# Patient Record
Sex: MALE | Race: WHITE
[De-identification: names, ages, dates, MRNs, and addresses within clinical notes are randomized per-mention and may not be internally consistent; named-entity substitution may affect disease eponyms.]

---

## 2021-01-13 DIAGNOSIS — Z23 NEED FOR VACCINATION: ICD-10-CM

## 2021-04-08 ENCOUNTER — HOSPITAL ENCOUNTER (OUTPATIENT)
Dept: HOSPITAL 8 - CFH | Age: 81
Discharge: HOME | End: 2021-04-08
Attending: INTERNAL MEDICINE
Payer: COMMERCIAL

## 2021-04-08 DIAGNOSIS — I25.9: ICD-10-CM

## 2021-04-08 DIAGNOSIS — I21.29: Primary | ICD-10-CM

## 2021-04-08 DIAGNOSIS — I10: ICD-10-CM

## 2021-04-08 DIAGNOSIS — I25.10: ICD-10-CM

## 2021-04-08 PROCEDURE — 93017 CV STRESS TEST TRACING ONLY: CPT

## 2021-04-08 PROCEDURE — A9502 TC99M TETROFOSMIN: HCPCS

## 2021-04-08 PROCEDURE — 78452 HT MUSCLE IMAGE SPECT MULT: CPT

## 2021-05-07 ENCOUNTER — HOSPITAL ENCOUNTER (OUTPATIENT)
Dept: HOSPITAL 8 - STAR | Age: 81
Discharge: HOME | End: 2021-05-07
Attending: INTERNAL MEDICINE
Payer: COMMERCIAL

## 2021-05-07 DIAGNOSIS — K80.50: ICD-10-CM

## 2021-05-07 DIAGNOSIS — R00.1: ICD-10-CM

## 2021-05-07 DIAGNOSIS — Z20.822: ICD-10-CM

## 2021-05-07 DIAGNOSIS — Z01.818: Primary | ICD-10-CM

## 2021-05-07 LAB
ALBUMIN SERPL-MCNC: 3.5 G/DL (ref 3.4–5)
ALP SERPL-CCNC: 256 U/L (ref 45–117)
ALT SERPL-CCNC: 82 U/L (ref 12–78)
ANION GAP SERPL CALC-SCNC: 3 MMOL/L (ref 5–15)
BILIRUB SERPL-MCNC: 0.6 MG/DL (ref 0.2–1)
CALCIUM SERPL-MCNC: 8.6 MG/DL (ref 8.5–10.1)
CHLORIDE SERPL-SCNC: 107 MMOL/L (ref 98–107)
CREAT SERPL-MCNC: 1.4 MG/DL (ref 0.7–1.3)
PROT SERPL-MCNC: 7.5 G/DL (ref 6.4–8.2)

## 2021-05-07 PROCEDURE — 93005 ELECTROCARDIOGRAM TRACING: CPT

## 2021-05-07 PROCEDURE — 80053 COMPREHEN METABOLIC PANEL: CPT

## 2021-05-07 PROCEDURE — U0003 INFECTIOUS AGENT DETECTION BY NUCLEIC ACID (DNA OR RNA); SEVERE ACUTE RESPIRATORY SYNDROME CORONAVIRUS 2 (SARS-COV-2) (CORONAVIRUS DISEASE [COVID-19]), AMPLIFIED PROBE TECHNIQUE, MAKING USE OF HIGH THROUGHPUT TECHNOLOGIES AS DESCRIBED BY CMS-2020-01-R: HCPCS

## 2021-05-07 PROCEDURE — 36415 COLL VENOUS BLD VENIPUNCTURE: CPT

## 2021-05-12 ENCOUNTER — HOSPITAL ENCOUNTER (OUTPATIENT)
Dept: HOSPITAL 8 - OUT | Age: 81
Discharge: HOME | End: 2021-05-12
Attending: INTERNAL MEDICINE
Payer: COMMERCIAL

## 2021-05-12 VITALS — DIASTOLIC BLOOD PRESSURE: 83 MMHG | SYSTOLIC BLOOD PRESSURE: 181 MMHG

## 2021-05-12 VITALS — WEIGHT: 164.91 LBS | BODY MASS INDEX: 26.5 KG/M2 | HEIGHT: 66 IN

## 2021-05-12 DIAGNOSIS — K29.50: ICD-10-CM

## 2021-05-12 DIAGNOSIS — K80.51: Primary | ICD-10-CM

## 2021-05-12 DIAGNOSIS — Z98.890: ICD-10-CM

## 2021-05-12 DIAGNOSIS — E03.9: ICD-10-CM

## 2021-05-12 DIAGNOSIS — E11.9: ICD-10-CM

## 2021-05-12 DIAGNOSIS — Z95.1: ICD-10-CM

## 2021-05-12 DIAGNOSIS — Z79.890: ICD-10-CM

## 2021-05-12 DIAGNOSIS — K22.5: ICD-10-CM

## 2021-05-12 DIAGNOSIS — I10: ICD-10-CM

## 2021-05-12 DIAGNOSIS — G43.909: ICD-10-CM

## 2021-05-12 DIAGNOSIS — K57.10: ICD-10-CM

## 2021-05-12 DIAGNOSIS — E78.00: ICD-10-CM

## 2021-05-12 DIAGNOSIS — Z79.84: ICD-10-CM

## 2021-05-12 DIAGNOSIS — K21.9: ICD-10-CM

## 2021-05-12 DIAGNOSIS — I25.2: ICD-10-CM

## 2021-05-12 DIAGNOSIS — Z79.899: ICD-10-CM

## 2021-05-12 DIAGNOSIS — Z79.82: ICD-10-CM

## 2021-05-12 PROCEDURE — 74328 X-RAY BILE DUCT ENDOSCOPY: CPT

## 2021-05-12 PROCEDURE — C1769 GUIDE WIRE: HCPCS

## 2021-05-12 PROCEDURE — 43239 EGD BIOPSY SINGLE/MULTIPLE: CPT

## 2021-05-12 PROCEDURE — 43264 ERCP REMOVE DUCT CALCULI: CPT

## 2021-05-12 PROCEDURE — 43259 EGD US EXAM DUODENUM/JEJUNUM: CPT

## 2021-05-12 PROCEDURE — 82962 GLUCOSE BLOOD TEST: CPT

## 2021-05-12 PROCEDURE — 88305 TISSUE EXAM BY PATHOLOGIST: CPT

## 2021-05-12 PROCEDURE — 43262 ENDO CHOLANGIOPANCREATOGRAPH: CPT

## 2024-06-19 ENCOUNTER — APPOINTMENT (OUTPATIENT)
Dept: RADIOLOGY | Facility: MEDICAL CENTER | Age: 84
DRG: 146 | End: 2024-06-19
Attending: EMERGENCY MEDICINE
Payer: COMMERCIAL

## 2024-06-19 ENCOUNTER — HOSPITAL ENCOUNTER (INPATIENT)
Facility: MEDICAL CENTER | Age: 84
LOS: 1 days | DRG: 146 | End: 2024-06-20
Attending: EMERGENCY MEDICINE | Admitting: HOSPITALIST
Payer: COMMERCIAL

## 2024-06-19 DIAGNOSIS — R13.10 DYSPHAGIA, UNSPECIFIED TYPE: ICD-10-CM

## 2024-06-19 DIAGNOSIS — C09.9 TONSILLAR CANCER (HCC): ICD-10-CM

## 2024-06-19 DIAGNOSIS — E87.1 HYPONATREMIA: ICD-10-CM

## 2024-06-19 DIAGNOSIS — E86.0 DEHYDRATION: ICD-10-CM

## 2024-06-19 PROBLEM — E11.9 TYPE 2 DIABETES MELLITUS (HCC): Status: ACTIVE | Noted: 2024-06-19

## 2024-06-19 PROBLEM — I48.91 ATRIAL FIBRILLATION (HCC): Status: ACTIVE | Noted: 2024-06-19

## 2024-06-19 PROBLEM — R62.7 FAILURE TO THRIVE IN ADULT: Status: ACTIVE | Noted: 2024-06-19

## 2024-06-19 PROBLEM — I25.10 CORONARY DISEASE: Status: ACTIVE | Noted: 2024-06-19

## 2024-06-19 LAB
ALBUMIN SERPL BCP-MCNC: 3 G/DL (ref 3.2–4.9)
ALBUMIN/GLOB SERPL: 0.9 G/DL
ALP SERPL-CCNC: 128 U/L (ref 30–99)
ALT SERPL-CCNC: 42 U/L (ref 2–50)
ANION GAP SERPL CALC-SCNC: 13 MMOL/L (ref 7–16)
AST SERPL-CCNC: 22 U/L (ref 12–45)
BASOPHILS # BLD AUTO: 0.3 % (ref 0–1.8)
BASOPHILS # BLD: 0.03 K/UL (ref 0–0.12)
BILIRUB SERPL-MCNC: 0.2 MG/DL (ref 0.1–1.5)
BUN SERPL-MCNC: 30 MG/DL (ref 8–22)
CALCIUM ALBUM COR SERPL-MCNC: 9.9 MG/DL (ref 8.5–10.5)
CALCIUM SERPL-MCNC: 9.1 MG/DL (ref 8.5–10.5)
CHLORIDE SERPL-SCNC: 95 MMOL/L (ref 96–112)
CO2 SERPL-SCNC: 21 MMOL/L (ref 20–33)
CREAT SERPL-MCNC: 0.83 MG/DL (ref 0.5–1.4)
EOSINOPHIL # BLD AUTO: 0.01 K/UL (ref 0–0.51)
EOSINOPHIL NFR BLD: 0.1 % (ref 0–6.9)
ERYTHROCYTE [DISTWIDTH] IN BLOOD BY AUTOMATED COUNT: 52.7 FL (ref 35.9–50)
GFR SERPLBLD CREATININE-BSD FMLA CKD-EPI: 86 ML/MIN/1.73 M 2
GLOBULIN SER CALC-MCNC: 3.5 G/DL (ref 1.9–3.5)
GLUCOSE BLD STRIP.AUTO-MCNC: 260 MG/DL (ref 65–99)
GLUCOSE SERPL-MCNC: 367 MG/DL (ref 65–99)
HCT VFR BLD AUTO: 34.8 % (ref 42–52)
HGB BLD-MCNC: 11.3 G/DL (ref 14–18)
IMM GRANULOCYTES # BLD AUTO: 0.17 K/UL (ref 0–0.11)
IMM GRANULOCYTES NFR BLD AUTO: 1.8 % (ref 0–0.9)
LYMPHOCYTES # BLD AUTO: 0.39 K/UL (ref 1–4.8)
LYMPHOCYTES NFR BLD: 4.1 % (ref 22–41)
MCH RBC QN AUTO: 28.7 PG (ref 27–33)
MCHC RBC AUTO-ENTMCNC: 32.5 G/DL (ref 32.3–36.5)
MCV RBC AUTO: 88.3 FL (ref 81.4–97.8)
MONOCYTES # BLD AUTO: 0.42 K/UL (ref 0–0.85)
MONOCYTES NFR BLD AUTO: 4.4 % (ref 0–13.4)
NEUTROPHILS # BLD AUTO: 8.55 K/UL (ref 1.82–7.42)
NEUTROPHILS NFR BLD: 89.3 % (ref 44–72)
NRBC # BLD AUTO: 0 K/UL
NRBC BLD-RTO: 0 /100 WBC (ref 0–0.2)
PLATELET # BLD AUTO: 456 K/UL (ref 164–446)
PMV BLD AUTO: 10.3 FL (ref 9–12.9)
POTASSIUM SERPL-SCNC: 5 MMOL/L (ref 3.6–5.5)
PROT SERPL-MCNC: 6.5 G/DL (ref 6–8.2)
RBC # BLD AUTO: 3.94 M/UL (ref 4.7–6.1)
SODIUM SERPL-SCNC: 129 MMOL/L (ref 135–145)
SODIUM UR-SCNC: 96 MMOL/L
WBC # BLD AUTO: 9.6 K/UL (ref 4.8–10.8)

## 2024-06-19 PROCEDURE — 700105 HCHG RX REV CODE 258: Performed by: EMERGENCY MEDICINE

## 2024-06-19 PROCEDURE — A9270 NON-COVERED ITEM OR SERVICE: HCPCS | Performed by: HOSPITALIST

## 2024-06-19 PROCEDURE — 99223 1ST HOSP IP/OBS HIGH 75: CPT | Mod: 25,AI | Performed by: HOSPITALIST

## 2024-06-19 PROCEDURE — 84300 ASSAY OF URINE SODIUM: CPT

## 2024-06-19 PROCEDURE — 99497 ADVNCD CARE PLAN 30 MIN: CPT | Performed by: HOSPITALIST

## 2024-06-19 PROCEDURE — 99285 EMERGENCY DEPT VISIT HI MDM: CPT

## 2024-06-19 PROCEDURE — 700102 HCHG RX REV CODE 250 W/ 637 OVERRIDE(OP): Performed by: HOSPITALIST

## 2024-06-19 PROCEDURE — 71045 X-RAY EXAM CHEST 1 VIEW: CPT

## 2024-06-19 PROCEDURE — 770004 HCHG ROOM/CARE - ONCOLOGY PRIVATE *

## 2024-06-19 PROCEDURE — 80053 COMPREHEN METABOLIC PANEL: CPT

## 2024-06-19 PROCEDURE — 82962 GLUCOSE BLOOD TEST: CPT

## 2024-06-19 PROCEDURE — 36415 COLL VENOUS BLD VENIPUNCTURE: CPT

## 2024-06-19 PROCEDURE — 85025 COMPLETE CBC W/AUTO DIFF WBC: CPT

## 2024-06-19 PROCEDURE — 83935 ASSAY OF URINE OSMOLALITY: CPT

## 2024-06-19 RX ORDER — SODIUM CHLORIDE 9 MG/ML
INJECTION, SOLUTION INTRAVENOUS CONTINUOUS
Status: DISCONTINUED | OUTPATIENT
Start: 2024-06-19 | End: 2024-06-20 | Stop reason: HOSPADM

## 2024-06-19 RX ORDER — ACETAMINOPHEN 325 MG/1
650 TABLET ORAL EVERY 6 HOURS PRN
Status: DISCONTINUED | OUTPATIENT
Start: 2024-06-19 | End: 2024-06-20 | Stop reason: HOSPADM

## 2024-06-19 RX ORDER — METOPROLOL SUCCINATE 25 MG/1
25 TABLET, EXTENDED RELEASE ORAL DAILY
COMMUNITY
Start: 2023-11-20 | End: 2024-11-19

## 2024-06-19 RX ORDER — LEVOTHYROXINE SODIUM 0.07 MG/1
75 TABLET ORAL
COMMUNITY

## 2024-06-19 RX ORDER — LABETALOL HYDROCHLORIDE 5 MG/ML
10 INJECTION, SOLUTION INTRAVENOUS EVERY 4 HOURS PRN
Status: DISCONTINUED | OUTPATIENT
Start: 2024-06-19 | End: 2024-06-20 | Stop reason: HOSPADM

## 2024-06-19 RX ORDER — METOPROLOL SUCCINATE 25 MG/1
25 TABLET, EXTENDED RELEASE ORAL DAILY
Status: DISCONTINUED | OUTPATIENT
Start: 2024-06-20 | End: 2024-06-19

## 2024-06-19 RX ORDER — ATORVASTATIN CALCIUM 40 MG/1
40 TABLET, FILM COATED ORAL DAILY
Status: DISCONTINUED | OUTPATIENT
Start: 2024-06-20 | End: 2024-06-20 | Stop reason: HOSPADM

## 2024-06-19 RX ORDER — LEVOTHYROXINE SODIUM 0.07 MG/1
75 TABLET ORAL
Status: DISCONTINUED | OUTPATIENT
Start: 2024-06-20 | End: 2024-06-20 | Stop reason: HOSPADM

## 2024-06-19 RX ORDER — GLYCOPYRROLATE 0.2 MG/ML
0.1 INJECTION INTRAMUSCULAR; INTRAVENOUS 4 TIMES DAILY
Status: DISCONTINUED | OUTPATIENT
Start: 2024-06-19 | End: 2024-06-20 | Stop reason: HOSPADM

## 2024-06-19 RX ORDER — LEVOTHYROXINE SODIUM 0.07 MG/1
75 TABLET ORAL
Status: DISCONTINUED | OUTPATIENT
Start: 2024-06-20 | End: 2024-06-19

## 2024-06-19 RX ORDER — ACETAMINOPHEN 325 MG/1
650 TABLET ORAL EVERY 6 HOURS PRN
Status: DISCONTINUED | OUTPATIENT
Start: 2024-06-19 | End: 2024-06-19

## 2024-06-19 RX ORDER — ATORVASTATIN CALCIUM 40 MG/1
40 TABLET, FILM COATED ORAL DAILY
COMMUNITY

## 2024-06-19 RX ORDER — ATORVASTATIN CALCIUM 40 MG/1
40 TABLET, FILM COATED ORAL DAILY
Status: DISCONTINUED | OUTPATIENT
Start: 2024-06-20 | End: 2024-06-19

## 2024-06-19 RX ORDER — SULFAMETHOXAZOLE AND TRIMETHOPRIM 400; 80 MG/1; MG/1
1 TABLET ORAL EVERY 8 HOURS
COMMUNITY
Start: 2024-06-11

## 2024-06-19 RX ORDER — CLINDAMYCIN HYDROCHLORIDE 300 MG/1
300 CAPSULE ORAL 3 TIMES DAILY
COMMUNITY
Start: 2024-05-02

## 2024-06-19 RX ORDER — LINAGLIPTIN 5 MG/1
5 TABLET, FILM COATED ORAL DAILY
COMMUNITY

## 2024-06-19 RX ADMIN — INSULIN HUMAN 5 UNITS: 100 INJECTION, SOLUTION PARENTERAL at 21:59

## 2024-06-19 RX ADMIN — METOPROLOL TARTRATE 12.5 MG: 25 TABLET, FILM COATED ORAL at 21:52

## 2024-06-19 RX ADMIN — SODIUM CHLORIDE: 9 INJECTION, SOLUTION INTRAVENOUS at 18:39

## 2024-06-19 SDOH — ECONOMIC STABILITY: TRANSPORTATION INSECURITY
IN THE PAST 12 MONTHS, HAS LACK OF RELIABLE TRANSPORTATION KEPT YOU FROM MEDICAL APPOINTMENTS, MEETINGS, WORK OR FROM GETTING THINGS NEEDED FOR DAILY LIVING?: NO

## 2024-06-19 SDOH — ECONOMIC STABILITY: TRANSPORTATION INSECURITY
IN THE PAST 12 MONTHS, HAS THE LACK OF TRANSPORTATION KEPT YOU FROM MEDICAL APPOINTMENTS OR FROM GETTING MEDICATIONS?: NO

## 2024-06-19 ASSESSMENT — ENCOUNTER SYMPTOMS
DIAPHORESIS: 0
SINUS PAIN: 0
CONSTIPATION: 0
SORE THROAT: 0
MYALGIAS: 0
ABDOMINAL PAIN: 0
PALPITATIONS: 0
WEAKNESS: 0
DIARRHEA: 0
FLANK PAIN: 0
HEADACHES: 0
BRUISES/BLEEDS EASILY: 0
CLAUDICATION: 0
FALLS: 0
BLOOD IN STOOL: 0
DOUBLE VISION: 0
TREMORS: 0
VOMITING: 0
HEMOPTYSIS: 0
POLYDIPSIA: 0
WHEEZING: 0
NECK PAIN: 0
NAUSEA: 0
PND: 0
BLURRED VISION: 0
PHOTOPHOBIA: 0
COUGH: 0
CHILLS: 0
DEPRESSION: 0
HALLUCINATIONS: 0
EYE PAIN: 0
STRIDOR: 0
BACK PAIN: 0
HEARTBURN: 0
SHORTNESS OF BREATH: 0
DIZZINESS: 0
FEVER: 0
TINGLING: 0
ORTHOPNEA: 0
SPUTUM PRODUCTION: 0

## 2024-06-19 ASSESSMENT — CHA2DS2 SCORE
AGE 65 TO 74: NO
HYPERTENSION: NO
CHA2DS2 VASC SCORE: 2
VASCULAR DISEASE: NO
CHF OR LEFT VENTRICULAR DYSFUNCTION: NO
PRIOR STROKE OR TIA OR THROMBOEMBOLISM: NO
SEX: MALE
AGE 75 OR GREATER: YES
DIABETES: NO

## 2024-06-19 ASSESSMENT — COGNITIVE AND FUNCTIONAL STATUS - GENERAL
DAILY ACTIVITIY SCORE: 18
TOILETING: A LOT
EATING MEALS: A LITTLE
PERSONAL GROOMING: A LITTLE
DRESSING REGULAR LOWER BODY CLOTHING: A LITTLE
SUGGESTED CMS G CODE MODIFIER DAILY ACTIVITY: CK
SUGGESTED CMS G CODE MODIFIER MOBILITY: CJ
DRESSING REGULAR UPPER BODY CLOTHING: A LITTLE
MOBILITY SCORE: 22
MOVING FROM LYING ON BACK TO SITTING ON SIDE OF FLAT BED: A LITTLE
MOVING TO AND FROM BED TO CHAIR: A LITTLE

## 2024-06-19 ASSESSMENT — SOCIAL DETERMINANTS OF HEALTH (SDOH)
WITHIN THE LAST YEAR, HAVE YOU BEEN HUMILIATED OR EMOTIONALLY ABUSED IN OTHER WAYS BY YOUR PARTNER OR EX-PARTNER?: NO
WITHIN THE LAST YEAR, HAVE TO BEEN RAPED OR FORCED TO HAVE ANY KIND OF SEXUAL ACTIVITY BY YOUR PARTNER OR EX-PARTNER?: NO
WITHIN THE LAST YEAR, HAVE YOU BEEN AFRAID OF YOUR PARTNER OR EX-PARTNER?: NO
WITHIN THE PAST 12 MONTHS, YOU WORRIED THAT YOUR FOOD WOULD RUN OUT BEFORE YOU GOT THE MONEY TO BUY MORE: NEVER TRUE
IN THE PAST 12 MONTHS, HAS THE ELECTRIC, GAS, OIL, OR WATER COMPANY THREATENED TO SHUT OFF SERVICE IN YOUR HOME?: NO
WITHIN THE PAST 12 MONTHS, THE FOOD YOU BOUGHT JUST DIDN'T LAST AND YOU DIDN'T HAVE MONEY TO GET MORE: NEVER TRUE
WITHIN THE LAST YEAR, HAVE YOU BEEN KICKED, HIT, SLAPPED, OR OTHERWISE PHYSICALLY HURT BY YOUR PARTNER OR EX-PARTNER?: NO

## 2024-06-19 ASSESSMENT — PATIENT HEALTH QUESTIONNAIRE - PHQ9
SUM OF ALL RESPONSES TO PHQ9 QUESTIONS 1 AND 2: 0
2. FEELING DOWN, DEPRESSED, IRRITABLE, OR HOPELESS: NOT AT ALL
1. LITTLE INTEREST OR PLEASURE IN DOING THINGS: NOT AT ALL

## 2024-06-19 ASSESSMENT — LIFESTYLE VARIABLES
HAVE PEOPLE ANNOYED YOU BY CRITICIZING YOUR DRINKING: NO
SUBSTANCE_ABUSE: 0
HAVE YOU EVER FELT YOU SHOULD CUT DOWN ON YOUR DRINKING: NO
EVER HAD A DRINK FIRST THING IN THE MORNING TO STEADY YOUR NERVES TO GET RID OF A HANGOVER: NO
CONSUMPTION TOTAL: NEGATIVE
EVER FELT BAD OR GUILTY ABOUT YOUR DRINKING: NO
DOES PATIENT WANT TO STOP DRINKING: NO
HOW MANY TIMES IN THE PAST YEAR HAVE YOU HAD 5 OR MORE DRINKS IN A DAY: 0
ALCOHOL_USE: NO
ON A TYPICAL DAY WHEN YOU DRINK ALCOHOL HOW MANY DRINKS DO YOU HAVE: 0
TOTAL SCORE: 0
AVERAGE NUMBER OF DAYS PER WEEK YOU HAVE A DRINK CONTAINING ALCOHOL: 0
TOTAL SCORE: 0
TOTAL SCORE: 0

## 2024-06-19 ASSESSMENT — FIBROSIS 4 INDEX
FIB4 SCORE: 0.85
FIB4 SCORE: 0.62
FIB4 SCORE: 0.62

## 2024-06-19 ASSESSMENT — PAIN DESCRIPTION - PAIN TYPE: TYPE: ACUTE PAIN

## 2024-06-19 NOTE — ED PROVIDER NOTES
"ER Provider Note    Scribed for Rosmery Nielson M.d. by Marcia High. 6/19/2024  4:44 PM    Primary Care Provider: Pcp Pt States None    CHIEF COMPLAINT   Chief Complaint   Patient presents with    Sent by MD     Tonsillar squamous cell carcinoma  Post radiation  Deep laryngeal secretions  Airway compromise, desats to low 80's       EXTERNAL RECORDS REVIEWED  Outpatient Notes Patient presents from Cancer care specialist with history of tonsillar squamous cell carcinoma, post radiation with intermittent airway compromise and desatting to the mid 80s. She gets her radiation at [facility]. She is on Eliquis. He also undergoes chemotherapy. He has a PEG tube.      HPI/ROS  LIMITATION TO HISTORY   Select: : None  OUTSIDE HISTORIAN(S):  Family at bedside    Dre Cleary is a 83 y.o. male, with a history of Tonsillar squamous cell carcinoma, who presents to the ED from Cancer care specialist, complaining of progressively worsening thick phlegm production in his mouth which is causing him to occasionally choke.  Oncologist today noted some transient oxygen desaturations due to the thick secretions.. He reports associated shortness of breath and intermittent episodes of choking due to the phlegm. He will occasionally spit it out, and it is described as \"clear saliva\". The patient has undergone 33 radiation treatments and 6 chemotherapy infusions, most recently on May 22, 2024. He was cleared to take food and fluids by mouth but he chooses not to due to the congestion in his throat. Family at bedside states he was able to tolerate one cup of coffee last week, but chooses to utilize the PEG tube instead of swallowing food or fluid. He was seen by his cardiologist in (Dr. Robins) at the end of May and was cleared from a cardiology stand point. He is still anticoagulated on Eliquis. He denies any cough, pain with swallowing, or difficulty swallowing.  He complains of some mild shortness of breath at this time but he is not " "in any respiratory distress.  Wife says that for about a week he was not taking any of his medications because he did not want to swallow them.  As soon as she found out he was not taking his medicine she started administering them to the PEG tube.  He has been getting his meds through PEG tube for the last week or so.  He does not check his blood sugars.    PAST MEDICAL HISTORY  Past Medical History:   Diagnosis Date    Cancer (HCC)     Diabetes (HCC)      SURGICAL HISTORY  History reviewed. No pertinent surgical history.    FAMILY HISTORY  History reviewed. No pertinent family history.    SOCIAL HISTORY   reports that he has never smoked. He has never used smokeless tobacco. He reports that he does not drink alcohol and does not use drugs.    CURRENT MEDICATIONS  No current outpatient medications    ALLERGIES  Patient has no known allergies.    PHYSICAL EXAM  /60   Pulse 81   Temp 36.6 °C (97.9 °F) (Temporal)   Resp 16   Ht 1.626 m (5' 4\")   Wt 55.4 kg (122 lb 2.2 oz)   SpO2 97%   BMI 20.96 kg/m²   Constitutional: chronically ill appearing, does not speak often but when he does his voice is normal  HENT: thick clear secretions pooling in his mouth. No trismus. Normocephalic, Atraumatic, Bilateral external ears normal, Oropharynx moist, attempted to look in the posterior oropharynx, but the patient has a severe gag reflex and unable to get a good visualization.  Eyes: PERRL, EOMI, Conjunctiva normal, No discharge.   Neck: Normal range of motion, supple, nontender. No stridor.  Lymphatic: No lymphadenopathy noted.   Cardiovascular: Normal heart rate, Normal rhythm, No murmurs, rubs or gallops   Thorax & Lungs: There are a few scattered rhonchi in the left base. No respiratory distress, No wheezing or rales No chest tenderness. No crepitus or subQ air  Abdomen: soft, good bowel sounds, no guarding no rebound, no masses, no pulsatile mass, no tenderness, no distention  Skin: Warm, Dry, No erythema, No " rash.   Back: No tenderness, No CVA tenderness.   Extremities: 2+ dp and pt pulses bilateral LEs;  Nontender; no pretibial edema  Neurologic: Alert & oriented x 4,does not speak often but when he does his voice is normal and speech is clear  Psychiatric: appropriate, normal affect    DIAGNOSTIC STUDIES    EKG/LABS  Results for orders placed or performed during the hospital encounter of 06/19/24   CBC WITH DIFFERENTIAL   Result Value Ref Range    WBC 9.6 4.8 - 10.8 K/uL    RBC 3.94 (L) 4.70 - 6.10 M/uL    Hemoglobin 11.3 (L) 14.0 - 18.0 g/dL    Hematocrit 34.8 (L) 42.0 - 52.0 %    MCV 88.3 81.4 - 97.8 fL    MCH 28.7 27.0 - 33.0 pg    MCHC 32.5 32.3 - 36.5 g/dL    RDW 52.7 (H) 35.9 - 50.0 fL    Platelet Count 456 (H) 164 - 446 K/uL    MPV 10.3 9.0 - 12.9 fL    Neutrophils-Polys 89.30 (H) 44.00 - 72.00 %    Lymphocytes 4.10 (L) 22.00 - 41.00 %    Monocytes 4.40 0.00 - 13.40 %    Eosinophils 0.10 0.00 - 6.90 %    Basophils 0.30 0.00 - 1.80 %    Immature Granulocytes 1.80 (H) 0.00 - 0.90 %    Nucleated RBC 0.00 0.00 - 0.20 /100 WBC    Neutrophils (Absolute) 8.55 (H) 1.82 - 7.42 K/uL    Lymphs (Absolute) 0.39 (L) 1.00 - 4.80 K/uL    Monos (Absolute) 0.42 0.00 - 0.85 K/uL    Eos (Absolute) 0.01 0.00 - 0.51 K/uL    Baso (Absolute) 0.03 0.00 - 0.12 K/uL    Immature Granulocytes (abs) 0.17 (H) 0.00 - 0.11 K/uL    NRBC (Absolute) 0.00 K/uL   COMP METABOLIC PANEL   Result Value Ref Range    Sodium 129 (L) 135 - 145 mmol/L    Potassium 5.0 3.6 - 5.5 mmol/L    Chloride 95 (L) 96 - 112 mmol/L    Co2 21 20 - 33 mmol/L    Anion Gap 13.0 7.0 - 16.0    Glucose 367 (H) 65 - 99 mg/dL    Bun 30 (H) 8 - 22 mg/dL    Creatinine 0.83 0.50 - 1.40 mg/dL    Calcium 9.1 8.5 - 10.5 mg/dL    Correct Calcium 9.9 8.5 - 10.5 mg/dL    AST(SGOT) 22 12 - 45 U/L    ALT(SGPT) 42 2 - 50 U/L    Alkaline Phosphatase 128 (H) 30 - 99 U/L    Total Bilirubin 0.2 0.1 - 1.5 mg/dL    Albumin 3.0 (L) 3.2 - 4.9 g/dL    Total Protein 6.5 6.0 - 8.2 g/dL    Globulin 3.5  1.9 - 3.5 g/dL    A-G Ratio 0.9 g/dL   ESTIMATED GFR   Result Value Ref Range    GFR (CKD-EPI) 86 >60 mL/min/1.73 m 2       RADIOLOGY/PROCEDURES   The attending emergency physician has independently interpreted the diagnostic imaging associated with this visit and am waiting the final reading from the radiologist.     My preliminary interpretation is a follows: ER MD is reviewed the patient's chest x-ray.  No obvious infiltrates.    Radiologist interpretation:  DX-CHEST-PORTABLE (1 VIEW)   Final Result      No acute cardiac or pulmonary abnormalities are identified.   Other chronic degenerative and post surgical changes.          COURSE & MEDICAL DECISION MAKING     ASSESSMENT, COURSE AND PLAN  Care Narrative: Patient presents to the ER at the request of his oncologist, Dr. Aneesh Lagos, cancer care specialist, for increased laryngeal secretions which occasionally cause patient to choke.  Oncology was concerned about airway compromise as patient would have some desaturations into the low 80s.  We have not seen patient desaturated all here in the ER today.  Patient says that the secretions occasionally cause him to choke.  He is post 33 radiation treatments for tonsillar cancer.  He also had cetuximab.  He finished his radiation and infusions on May 22.  He currently is not on any therapy.  Here in the ER the patient does have some clear thick gelatinous secretions.  No stridor.  No trismus.  He is not drooling.  He does not have any pain when he swallows.  No airway compromise at this time.  Oncology requested an ENT consultation.  I spoke with Dr. Nielson, ENT on-call.  She says that this problem is extremely common in people who have tonsillar cancer in her post radiation therapy as the radiation tends to delong all the saliva glands and the only thing to do in the situation is to encourage the patient to drink fluid.  Patient has a PEG tube.  The wife says that he has not had any desire to drink or eat over the  "last few weeks.  He drank a little bit of coffee last week, but since then has not been taking anything p.o. despite being told that he is able to go ahead and try to take p.o. food and fluid.  Patient has not had fevers or chills.  He has a little bit of rhonchi on the left on auscultation.  He is not in any respiratory distress.  Chest x-ray is clear.  No obvious aspiration pneumonia.  Sodium is low at 129.  His sugar is elevated in the 300s.  We attempted to give the patient some fluids per ENT recommendation, but the patient says \"I just choke on them.\"  I spoke with the .  Patient meets criteria for admission given his sodium of 129 and his dehydration.  I spoke with Dr. Sevilla, hospitalist on-call.  He will kindly evaluate the patient for hospitalization.  He will get a speech therapy evaluation as well as a palliative care consult.    Hydration: Based on the patient's presentation of Dehydration the patient was given IV fluids. IV Hydration was used because oral hydration was not adequate alone. Upon recheck following hydration, the patient was improved.     4:59 PM - Patient seen and examined at bedside. Discussed plan of care, including labs, ENT Consult, and observation of his oxygen saturation. Patient agrees to the plan of care. Ordered for DX-chest, CBC and CMP to evaluate his symptoms.      5:48 PM- Paged ENT.    5:49 PM I discussed the patient's case and the above findings with Dr. Nielson (ENT) who says there's nothing to do on their end. People who have this kind of cancer with radiation therapy usually have their saliva glands fried from the radiation and the only thing to do in the situation is encourage patient to  drink more water.  Dr. Nielson does not think that imaging is going to be helpful or useful in any way.  She does not think that scoping or doing any sort of intervention from an ENT standpoint is going to be helpful.    6:38 PM- Spoke with case management regarding in " patient requirements. Patient does meet requirement for low sodium. Will discuss with the patient and give him the option.    6:48 PM- I reassessed the patient at this time and informed them of my consult with ENT. I advised copious fluid intake. I also discussed plan for admission to the hospital due to electrolyte abnormalities.     6:51 PM I discussed the patient's case and the above findings with Dr. Sevilla (hospitalist) who agrees to hospitalize the patient and take over the plan of care moving forward.     ADDITIONAL PROBLEM LIST  Problem #1: Thick secretions in the mouth which occasionally cause patient to choke.  Problem #2: Concern for desaturation episodes at the Oncology office today    DISPOSITION AND DISCUSSIONS  I have discussed management of the patient with the following physicians and MAHI's:  Dr. Nielson (ENT)  and Dr. Sevilla (hospitalist)    Discussion of management with other Q or appropriate source(s): None     Escalation of care considered, and ultimately not performed: acute inpatient care management, however at this time, the patient is most appropriate for outpatient management.    Barriers to care at this time, including but not limited to:  No PCP in EPIC system .     Decision tools and prescription drugs considered including, but not limited to: Antibiotics no evidence of aspiration pneumonia at this time.  Patient has not had any desaturations.  At this time no need for antibiotics. .    DISPOSITION:  Patient will be hospitalized by Dr. Sevilla in guarded condition.    FINAL DIANGOSIS  1. Dehydration Acute   2. Hyponatremia Acute          This dictation has been created using voice recognition software. The accuracy of the dictation is limited by the abilities of the software. I expect there may be some errors of grammar and possibly content. I made every attempt to manually correct the errors within my dictation. However, errors related to voice recognition software may still exist and  should be interpreted within the appropriate context.     Marcia TODD (Sekou), am scribing for, and in the presence of, Rosmery Nielson M.D..    Electronically signed by: Marcia High (Sekou), 6/19/2024    Rosmery TODD M.D. personally performed the services described in this documentation, as scribed by Marcia High in my presence, and it is both accurate and complete.    The note accurately reflects work and decisions made by me.  Rosmery Nielson M.D.  6/19/2024  8:10 PM

## 2024-06-19 NOTE — ED TRIAGE NOTES
Dre Cleary  83 y.o. male  Chief Complaint   Patient presents with    Sent by MD     Tonsillar squamous cell carcinoma  Post radiation  Deep laryngeal secretions  Airway compromise, desats to low 80's         Pt amb to weight with steady gait, placed in w/c for safety. Maintaining airway with no distress to note.   Respirations are even and unlabored.  Pt placed in family room . Pt educated on triage process. Pt encouraged to alert staff for any changes.

## 2024-06-20 VITALS
OXYGEN SATURATION: 95 % | RESPIRATION RATE: 18 BRPM | DIASTOLIC BLOOD PRESSURE: 70 MMHG | TEMPERATURE: 98 F | HEIGHT: 64 IN | SYSTOLIC BLOOD PRESSURE: 146 MMHG | BODY MASS INDEX: 21.15 KG/M2 | HEART RATE: 70 BPM | WEIGHT: 123.9 LBS

## 2024-06-20 PROBLEM — Z71.89 ADVANCE CARE PLANNING: Status: ACTIVE | Noted: 2024-06-20

## 2024-06-20 LAB
ALBUMIN SERPL BCP-MCNC: 2.5 G/DL (ref 3.2–4.9)
ALBUMIN/GLOB SERPL: 0.9 G/DL
ALP SERPL-CCNC: 103 U/L (ref 30–99)
ALT SERPL-CCNC: 35 U/L (ref 2–50)
ANION GAP SERPL CALC-SCNC: 11 MMOL/L (ref 7–16)
ANION GAP SERPL CALC-SCNC: 7 MMOL/L (ref 7–16)
ANION GAP SERPL CALC-SCNC: 9 MMOL/L (ref 7–16)
AST SERPL-CCNC: 28 U/L (ref 12–45)
BILIRUB SERPL-MCNC: 0.2 MG/DL (ref 0.1–1.5)
BUN SERPL-MCNC: 20 MG/DL (ref 8–22)
BUN SERPL-MCNC: 20 MG/DL (ref 8–22)
BUN SERPL-MCNC: 26 MG/DL (ref 8–22)
CALCIUM ALBUM COR SERPL-MCNC: 9.3 MG/DL (ref 8.5–10.5)
CALCIUM SERPL-MCNC: 7.9 MG/DL (ref 8.5–10.5)
CALCIUM SERPL-MCNC: 8.1 MG/DL (ref 8.5–10.5)
CALCIUM SERPL-MCNC: 8.2 MG/DL (ref 8.5–10.5)
CHLORIDE SERPL-SCNC: 102 MMOL/L (ref 96–112)
CHLORIDE SERPL-SCNC: 104 MMOL/L (ref 96–112)
CHLORIDE SERPL-SCNC: 106 MMOL/L (ref 96–112)
CO2 SERPL-SCNC: 21 MMOL/L (ref 20–33)
CO2 SERPL-SCNC: 21 MMOL/L (ref 20–33)
CO2 SERPL-SCNC: 24 MMOL/L (ref 20–33)
CREAT SERPL-MCNC: 0.7 MG/DL (ref 0.5–1.4)
CREAT SERPL-MCNC: 0.71 MG/DL (ref 0.5–1.4)
CREAT SERPL-MCNC: 0.77 MG/DL (ref 0.5–1.4)
CRP SERPL HS-MCNC: 1.6 MG/DL (ref 0–0.75)
EXTRA TUBE BLU BLU: NORMAL
GFR SERPLBLD CREATININE-BSD FMLA CKD-EPI: 88 ML/MIN/1.73 M 2
GFR SERPLBLD CREATININE-BSD FMLA CKD-EPI: 91 ML/MIN/1.73 M 2
GFR SERPLBLD CREATININE-BSD FMLA CKD-EPI: 91 ML/MIN/1.73 M 2
GLOBULIN SER CALC-MCNC: 2.9 G/DL (ref 1.9–3.5)
GLUCOSE BLD STRIP.AUTO-MCNC: 109 MG/DL (ref 65–99)
GLUCOSE BLD STRIP.AUTO-MCNC: 173 MG/DL (ref 65–99)
GLUCOSE SERPL-MCNC: 126 MG/DL (ref 65–99)
GLUCOSE SERPL-MCNC: 234 MG/DL (ref 65–99)
GLUCOSE SERPL-MCNC: 245 MG/DL (ref 65–99)
OSMOLALITY SERPL: 293 MOSM/KG H2O (ref 278–298)
OSMOLALITY UR: 492 MOSM/KG H2O (ref 300–900)
POTASSIUM SERPL-SCNC: 4.3 MMOL/L (ref 3.6–5.5)
POTASSIUM SERPL-SCNC: 4.6 MMOL/L (ref 3.6–5.5)
POTASSIUM SERPL-SCNC: 4.8 MMOL/L (ref 3.6–5.5)
PREALB SERPL-MCNC: 13.6 MG/DL (ref 18–38)
PROT SERPL-MCNC: 5.4 G/DL (ref 6–8.2)
SODIUM SERPL-SCNC: 134 MMOL/L (ref 135–145)
SODIUM SERPL-SCNC: 134 MMOL/L (ref 135–145)
SODIUM SERPL-SCNC: 137 MMOL/L (ref 135–145)

## 2024-06-20 PROCEDURE — 99239 HOSP IP/OBS DSCHRG MGMT >30: CPT | Performed by: STUDENT IN AN ORGANIZED HEALTH CARE EDUCATION/TRAINING PROGRAM

## 2024-06-20 PROCEDURE — 80048 BASIC METABOLIC PNL TOTAL CA: CPT

## 2024-06-20 PROCEDURE — 83930 ASSAY OF BLOOD OSMOLALITY: CPT

## 2024-06-20 PROCEDURE — 84134 ASSAY OF PREALBUMIN: CPT

## 2024-06-20 PROCEDURE — A9270 NON-COVERED ITEM OR SERVICE: HCPCS | Performed by: HOSPITALIST

## 2024-06-20 PROCEDURE — 80053 COMPREHEN METABOLIC PANEL: CPT

## 2024-06-20 PROCEDURE — 86140 C-REACTIVE PROTEIN: CPT

## 2024-06-20 PROCEDURE — 92610 EVALUATE SWALLOWING FUNCTION: CPT

## 2024-06-20 PROCEDURE — 700111 HCHG RX REV CODE 636 W/ 250 OVERRIDE (IP): Performed by: HOSPITALIST

## 2024-06-20 PROCEDURE — 97166 OT EVAL MOD COMPLEX 45 MIN: CPT

## 2024-06-20 PROCEDURE — 82962 GLUCOSE BLOOD TEST: CPT

## 2024-06-20 PROCEDURE — 700102 HCHG RX REV CODE 250 W/ 637 OVERRIDE(OP): Performed by: HOSPITALIST

## 2024-06-20 PROCEDURE — 36415 COLL VENOUS BLD VENIPUNCTURE: CPT

## 2024-06-20 PROCEDURE — 700105 HCHG RX REV CODE 258: Performed by: EMERGENCY MEDICINE

## 2024-06-20 RX ADMIN — GLYCOPYRROLATE 0.1 MG: 0.2 INJECTION INTRAMUSCULAR; INTRAVENOUS at 09:00

## 2024-06-20 RX ADMIN — ATORVASTATIN CALCIUM 40 MG: 40 TABLET, FILM COATED ORAL at 05:55

## 2024-06-20 RX ADMIN — SODIUM CHLORIDE: 9 INJECTION, SOLUTION INTRAVENOUS at 02:54

## 2024-06-20 RX ADMIN — METOPROLOL TARTRATE 12.5 MG: 25 TABLET, FILM COATED ORAL at 05:55

## 2024-06-20 RX ADMIN — LEVOTHYROXINE SODIUM 75 MCG: 0.07 TABLET ORAL at 05:55

## 2024-06-20 RX ADMIN — GLYCOPYRROLATE 0.1 MG: 0.2 INJECTION INTRAMUSCULAR; INTRAVENOUS at 12:19

## 2024-06-20 RX ADMIN — INSULIN HUMAN 2 UNITS: 100 INJECTION, SOLUTION PARENTERAL at 12:16

## 2024-06-20 ASSESSMENT — COGNITIVE AND FUNCTIONAL STATUS - GENERAL
HELP NEEDED FOR BATHING: A LITTLE
SUGGESTED CMS G CODE MODIFIER DAILY ACTIVITY: CJ
DRESSING REGULAR UPPER BODY CLOTHING: A LITTLE
TOILETING: A LITTLE
DRESSING REGULAR LOWER BODY CLOTHING: A LITTLE
DAILY ACTIVITIY SCORE: 20

## 2024-06-20 ASSESSMENT — ACTIVITIES OF DAILY LIVING (ADL): TOILETING: INDEPENDENT

## 2024-06-20 ASSESSMENT — PAIN DESCRIPTION - PAIN TYPE: TYPE: ACUTE PAIN

## 2024-06-20 NOTE — DISCHARGE PLANNING
Care Transition Team Assessment  Patient is a 83 year old male admitted for dysphagia. Please see H&P for prior medical history. RN YISSEL met with patient at bedside to complete assessment. Patient is A&O X 4 and able to verify the information on the face sheet. Patient lives in a 2 story home, [address] with his wife, Ana. Patient states he does have trouble using the stairs. However, everything that he needs access to is on one level. Confirmed patients DPOA is Ana Cleary (p) [phone]. Patient does have a living will/ advanced directive. I told patient to have his wife bring a copy, so we can scan it into our records. Patient reports prior to admission he was using a front wheel walker to ambulate. His wife manages his medications and administers his tube feeds via his PEG tube. His wife assists him with ADL's and IADL's. He denies any prior HH/IPR/SNF. Patient does go to outpatient PT in [place] 2 times a week. He receives his tube feeding supplies through [employer]. It is mailed to his house. Patient reports his wife is great support.  Pt is retired and receives SSI. Patients PCP is Dr.Timothy Parker in [place]. Patients preferred pharmacy is [facility] in [place], [place]. Patient denies any use of alcohol, recreational drugs or mental health concerns. Confirmed insurance coverage is [employer]. Patients wife is able to provide transportation once medically clear. Patient would like to return home upon discharge. Primary Oncologist is Dr. Lagos [facility]     Information Source  Orientation Level: Oriented X4  Information Given By: Patient  Informant's Name: Sara  Who is responsible for making decisions for patient? : Patient       Elopement Risk  Legal Hold: No  Ambulatory or Self Mobile in Wheelchair: No-Not an Elopement Risk    Interdisciplinary Discharge Planning  Lives with - Patient's Self Care Capacity: Spouse  Patient or legal guardian wants to designate a caregiver:  No  Support Systems: Spouse / Significant Other  Housing / Facility: [address]    Discharge Preparedness  What is your plan after discharge?: Home with help  What are your discharge supports?: Spouse  Prior Functional Level: Ambulatory, Independent with Activities of Daily Living, Uses Walker  Difficulity with ADLs: Walking  Difficulty with ADLs Comment: uses front wheel walker  Difficulity with IADLs: Driving, Managing medication  Difficulity with IADL Comments: Wife helps with him    Functional Assesment  Prior Functional Level: Ambulatory, Independent with Activities of Daily Living, Uses Walker    Finances  Financial Barriers to Discharge: No  Prescription Coverage: Yes    Vision / Hearing Impairment  Vision Impairment : Yes  Right Eye Vision: Wears Glasses  Left Eye Vision: Wears Glasses  Hearing Impairment : No       Advance Directive  Advance Directive?: Living Will, DPOA for Health Care  Durable Power of  Name and Contact : Ana Cleary [phone]    Domestic Abuse  Have you ever been the victim of abuse or violence?: No  Possible Abuse/Neglect Reported to:: Not Applicable    Psychological Assessment  History of Substance Abuse: None  History of Psychiatric Problems: No  Non-compliant with Treatment: No  Newly Diagnosed Illness: No    Discharge Risks or Barriers  Discharge risks or barriers?: Complex medical needs  Patient risk factors: Complex medical needs    Anticipated Discharge Information  Discharge Disposition: Discharged to home/self care (01)

## 2024-06-20 NOTE — PROGRESS NOTES
Pt transferred to [facility] via [facility] transport. Discharge/transfer paperwork sent with patient. This RN called report to YOSEF Gallardo at [facility].

## 2024-06-20 NOTE — ASSESSMENT & PLAN NOTE
Secondary from radiation tonsillar cancer  SLP evaluation  Patient does have secretions and I will start him on IV Robinul for now  Keep n.p.o.  Patient does have a PEG tube

## 2024-06-20 NOTE — PROGRESS NOTES
4 Eyes Skin Assessment Completed by YOSEF Lombardi and YOSEF Dobson.    Head WDL  Ears WDL  Nose WDL  Mouth WDL  Neck WDL  Breast/Chest Scar. Flaky  Shoulder Blades WDL. Flaky  Spine WDL Flaky  (R) Arm/Elbow/Hand Redness and Blanching  (L) Arm/Elbow/Hand Redness and Blanching  Abdomen WDL. PEG tube. Flaky   Groin WDL  Scrotum/Coccyx/Buttocks Excoriation, Scar, and Scab  (R) Leg WDL  (L) Leg WDL  (R) Heel/Foot/Toe Redness and Blanching  (L) Heel/Foot/Toe Redness and Blanching          Devices In Places Condom Cath      Interventions In Place Q2 Turns    Possible Skin Injury Yes    Pictures Uploaded Into Epic Yes  Wound Consult Placed Yes  RN Wound Prevention Protocol Ordered Yes

## 2024-06-20 NOTE — ASSESSMENT & PLAN NOTE
Start on insulin sliding scale with serial Accu-Checks  Check hemoglobin A1c  Hypoglycemic protocol in place

## 2024-06-20 NOTE — THERAPY
"Speech Language Pathology   Clinical Swallow Evaluation     Patient Name: Der Cleary  AGE:  83 y.o., SEX:  male  Medical Record #: [id number]  Date of Service: 2024    History of Present Illness  82 y/o M admit  w/ increased secretions and related episodes of hypoxia to the low 80's.   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  He has chronic dysphagia and follows w/ an SLP via [facility]. He has a PEG tube. Records indicate a recent MBS ~ 3 months (Radiologist notes indicates no gross aspiration, recs for a thin liquids and pureed diet).      PMHx: DM, tonsillar SCC, CABG, Afib, CKD, Barretts Esophagus, GERD    CXR : 'No acute cardiac or pulmonary abnormalities are identified.  Other chronic degenerative and post surgical changes\"    General Information:  Vitals  O2 Delivery Device: None - Room Air  Level of Consciousness: Awake, Alert     Orientation: Self, , General place, Current month, Current year  Follows Directives: Yes    Prior Living Situation & Level of Function:  Housing / Facility: [address]  Lives with - Patient's Self Care Capacity: Spouse     Communication: WNL  Swallowing: Pt has chronic dysphagia secondary to tonsillar SCC. Records indicate most recent MBS was 3 months ago w/ recs for a thin liquids + purees diet. Pt also w/ PEG.     Oral Mechanism Evaluation:  Dentition: Natural dentition   Facial Symmetry: Equal  Facial Sensation: Equal     Labial Observations: WFL   Lingual Observations:  (Reduced ROM)  Motor Speech: Reduced jaw opening and reduced lingual ROM impacting speech clarity mildly  Other: submandibular lymphadenopathy    Assessment of Lingual Range of Motion  (Lazarus et al., 2014)  Assessed Movement Normal (100) Mild-Moderate (50) Severe (25) Profound (0)   Lingual Protrusion >15 mm past upper lip margin >1 mm but <15 mm past the upper lip margin Some " "movement but unable to reach upper lip margin No movement   Right Lingual Lateralization Able to fully touch the corner of the mouth 50% reduction of movement to corner of the mouth in ether direction >50 % reduction in movement No movement   Left Lingual Lateralization Able to fully touch the corner of the mouth 50% reduction of movement to corner of the mouth in ether direction >50 % reduction in movement No movement   Lingual Elevation Complete tongue tip contact with the upper alveolar ridge Tongue tip elevation but no contact with the upper alveolar ridge  No visible tongue tip elevation No movement     Assessment of Trismus (Danyel et al., 2006)  Severity Measurement Number of Fingers   Normal 35-45 mm 3-4   Mild 30-34 mm 2-3   Moderate 15-29 mm 1-2   Severe 5-14 mm 1   Profound 0-4 mm 0     Laryngeal Function:  Secretion Management: Excess secretions, suctioning required  Voice Quality:  (Reduced in vocal intensity)  Cough: Perceptually weak     Subjective  Pt seen alert & grossly oriented, saturating on room air. His spouse was at bedside. Pt had been set-up with suction and was actively using it to help manage \"thick phlegm\". Pt complained of xerostomia as well. He reproted he hadn't been eating because \"it makes me choke\"    Assessment  Current Method of Nutrition: NPO until cleared by speech pathology, PEG tube  Positioning: Santos's (60-90 degrees)  Bolus Administration: Patient    O2 Delivery Device: None - Room Air  Factor(s) Affecting Performance: Impaired endurance  Tracheostomy : No     Swallowing Trials:  Swallowing Trials  Ice: WFL  Thin Liquid (TN0): Impaired    Comments: Pt expressing minimal desire to take PO on this date \"It makes me choke\". Agreeable to trials of ice chips and water. Spouse at bedside available to provide encouragement and additional PMH. Oral mech exam w/ submandibular lymphadenopathy, reduced lingual ROM and mild trismus. Xerostomia noted; pt denied oral or throat pain. " Inconsistent delayed cough noted w/ chips- suspect r/t secretion mobilization. Increased, immediate coughing suggestive of airway invasion noted w/ teaspoons of water- pt politely declined further trials d/t same.     Session focused on providing verbal and demo education on oral care regimen including moisturizer to combat xerostomia, strategies to cough and clear secretions and use of the [place]. Pt encouraged to increased physical mobility. Pt demo'd demonstration teachback of concepts.   Pt and spouse expressed understanding of exercises and other ST interventions to maintain the integrity of the swallow through and post rx and chemo; they reported they follow up with Annabelle SLP at [facility].     Clinical Impressions  - Signs of acute on chronic oropharyngeal dysphagia r/t right palatine tonsil SCC and effects subsequent treatments (chemo, radiation) including significant xerostomia. Alternate means of nutrition/hydration have been indicated and pt has a PEG. Pt agreeable to taking ice chips PO throughout the day- ST to provide further PO trials as tolerated by pt. Pt and spouse also agreeable to initiating a rigorous oral care regimen, oral suctioning to improve secretion management.    - Recent instrumentation has indicated that pt's swallow is intact for a modified diet of thin/all liquids and purees, though he appears to have increased overt s/s of aspiration during this admission w/ scant amounts of liquids, perhaps d/t increased secretions. If overt s/s of aspiration persist, consider repeat instrumentation.     Addendum: Per notes, pt is pending transfer to [facility]. Recommend ongoing ST f/u at that facility upon transfer.     Recommendations  Diet Consistency: NPO w/ alternate means of nutrition/hydration (pt w/ PEG). Ice chips throughout the day to maintain integrity of the swallow  Instrumentation: Instrumental swallow study pending clinical progress  Medication: Non Oral  Supervision:  "Independent (Independent for ice chips)  Positioning: Fully upright and midline during oral intake  Oral Care: Q4h     SLP Treatment Plan  Treatment Plan: Dysphagia Treatment  SLP Frequency: 4x Per Week  Estimated Duration: Until Therapy Goals Met    Anticipated Discharge Needs  Discharge Recommendations: Recommend outpatient speech therapy services (Recommend pt continue services w/ established OP SLP which whom he has already been working)   Therapy Recommendations Upon DC: Dysphagia Training      Patient / Family Goals  Patient / Family Goal #1: \"I want him to be able to swallow\"- spouse  Short Term Goals  Short Term Goal # 1: Pt will demo improved management of secretions and xerostomia through use of suction, an oral care regimen, and ongoing PO intake of ice chips  Short Term Goal # 2: Pt will tolerate trials of thin liquids and purees w/o signs of aspiration; otherwise, proceed w/ repeat instrumentation    Katty Calles SLP   "

## 2024-06-20 NOTE — ED NOTES
Medication history reviewed with patients family at bedside.   Med rec is complete  Allergies reviewed.     Patient was prescribed a 30 day course of Clindamycin 300mg tid on 5/2/24- this was completed per family.    Patient was also prescribed a 7 day course of Bactrim 400-80mg tid on 6/11/24- last dose 6/19/24- family states they misread label and were only giving Bactrim once daily until today 6/19/24.    Anticoagulants: Eliquis 5mg- last dose 6/19/24 am.    Andres Hannah, PhT

## 2024-06-20 NOTE — DIETARY
"Nutrition Support Assessment:  Day 1 of admit.  Dre Cleary is a 83 y.o. male with admitting DX of dysphagia.    MST score of 5 for reported unintentional weight loss of 34lb+ x 6 months, decreased appetite, and difficulty chewing on home TF.     Current problem list:  Dysphagia  Coronary disease  AFIB  T2DM  Hyponatremia  FTT  Tonsillar cancer     Assessment:  Estimated Nutritional Needs based on:   Height: 162.6 cm (5' 4.02\")  Weight: 56.2 kg (123 lb 14.4 oz) taken via bed scale on 6/19  Body mass index is 21.26 kg/m². BMI classification: normal, below ideal given advanced age >75 y.o.    Calculation/Equation:   REE per MSJ = 1169 kcal x 1.2-1.35 = 1403 - 1579 kcal  Total calories/day: 1400 - 1600 (25 - 28 kcal/kg)  Total grams protein/day: 56 - 70 (1 - 1.25 g/kg)     Evaluation:   Pt with pre-existing PEG placed 4/5.  Pt admitted for airway compromise. PMHx includes tonsillar squamous cell carcinoma, diabetes.  [facility] dietitian Татьяна reached out to discuss pt. Noted he met criteria for malnutrition based on previous wt loss, muscle wasting, and fat loss. Noted concern for refeeding risk. Shared home tube feed regimen of 5.5 containers of Compleat 1.4 @75 mL/hr x ~18 hours.  Per chart review, pt has lost 25 lb (16.9% bodyweight) x 7 months, which is severe.  6/19/24 56.2 kg (123 lb 14.4 oz)   5/29/24 55.8 kg (123 lb)   4/7/24 60.9 kg (134 lb 4.2 oz)   2/21/24 65.9 kg (145 lb 3.2 oz)   11/20/23 67.1 kg (148 lb)   7/5/23 69.3 kg (152 lb 12.8 oz)   Labs: POC glucose 109  Meds: Lipitor, SSI, Lopressor, NS @ 125 mL/hr  Last BM: PTA      Malnutrition Risk: Pt meets ASPEN criteria for severe malnutrition in the context of chronic disease r/t cancer as evidenced by severe weight loss of 16.9% in 7 months and severe fat loss and muscle wasting as noted by [facility] RD Татьяна.     Recommendations/Plan:  To start with 24 hour continuous infusion for tube feeds: Compleat 1.4 @ 60 mL/hr to provide 1526 kcal, 69 g " protein, and 1192 mL free water per day.  Home pump rate (transition to this rate prior to discharge): 5.5 containers (1375 mL) of Compleat 1.4 via pump, @ 75 mL/hr for 18 hours 20 mins to provide 1458 kcal, 66 gm protein, and 1139 mL free water per day.  Compleat 1.4 comes in cartons, will have to utilize open bag system.   Pt is connected with [facility], so CM does not need to send any updated prescription.   Monitor refeeding labs  Monitor glucose  Fluids per MD  Monitor weight      RD following

## 2024-06-20 NOTE — DISCHARGE PLANNING
Case Management Discharge Planning    Admission Date: 6/19/2024  GMLOS: 3.6  ALOS: 1    6-Clicks ADL Score: 18  6-Clicks Mobility Score: 22      Anticipated Discharge Dispo: Discharge Disposition: Discharged to home/self care (01)    DME Needed: No    Action(s) Taken: Patient is requesting to transfer to [facility] due to insurance.  initiated transfer. Patient has been accepted at [facility]. Awaiting bed assignment.     Escalations Completed: None    Medically Clear: No    Next Steps: Pending transfer to [facility].     Barriers to Discharge: None    Is the patient up for discharge tomorrow: No

## 2024-06-20 NOTE — HOSPITAL COURSE
Dre Cleary is an 83-year-old male with PMHx DMT2, tonsillar cancer s/p chemoradiation, CAD s/p CABG, A-fib.  Admitted 6/19 for dysphagia.    Patient was seen by his oncologist Dr. Lagos on 6/19 in the office.  He was noted to be hypoxic, dehydrated and unable to manage his secretions.  He has a PEG tube for which his wife does bolus feedings 4 times per day.  She also gives him 1 bottle of 16 ounces of free water 4 times daily through his PEG tube.    Labs on admission were notable for Hb 11.2.  Sodium 129 improved to 134 at time of transfer.  Patient is currently n.p.o. and pending a speech evaluation.  He continues to have dysphagia with his secretions.  Suctioning has been helping.    I would recommend touching base with his oncologist to have a better understanding of prognosis and possible goals of care.  Additionally, I would recommend speech therapy consultation for ongoing dysphagia.

## 2024-06-20 NOTE — CARE PLAN
The patient is Watcher - Medium risk of patient condition declining or worsening    Shift Goals  Clinical Goals: Monitor 02 sats, NPO with tube feedings  Patient Goals: Rest  Family Goals: No family present    Progress made toward(s) clinical / shift goals:        Problem: Knowledge Deficit - Standard  Goal: Patient and family/care givers will demonstrate understanding of plan of care, disease process/condition, diagnostic tests and medications  Description: Target End Date:  1-3 days or as soon as patient condition allows    Document in Patient Education    1.  Patient and family/caregiver oriented to unit, equipment, visitation policy and means for communicating concern  2.  Complete/review Learning Assessment  3.  Assess knowledge level of disease process/condition, treatment plan, diagnostic tests and medications  4.  Explain disease process/condition, treatment plan, diagnostic tests and medications  Outcome: Progressing      Problem: Safety  Goal: Will remain free from injury  Outcome: Progressing  Goal: Will remain free from falls  Outcome: Progressing

## 2024-06-20 NOTE — DISCHARGE SUMMARY
Discharge Summary    CHIEF COMPLAINT ON ADMISSION  Chief Complaint   Patient presents with    Sent by MD     Tonsillar squamous cell carcinoma  Post radiation  Deep laryngeal secretions  Airway compromise, desats to low 80's         Reason for Admission  Sent By MD    Admission Date  6/19/2024     CODE STATUS  Full Code    HPI & HOSPITAL COURSE    Dre Cleary is an 83-year-old male with PMHx DMT2, tonsillar cancer s/p chemoradiation, CAD s/p CABG, A-fib.  Admitted 6/19 for dysphagia.    Patient was seen by his oncologist Dr. Lagos on 6/19 in the office.  He was noted to be hypoxic, dehydrated and unable to manage his secretions.  He has a PEG tube for which his wife does bolus feedings 4 times per day.  She also gives him 1 bottle of 16 ounces of free water 4 times daily through his PEG tube.    Labs on admission were notable for Hb 11.2.  Sodium 129 improved to 134 at time of transfer.  Patient is currently n.p.o. and pending a speech evaluation.  He continues to have dysphagia with his secretions.  Suctioning has been helping.    I would recommend touching base with his oncologist to have a better understanding of prognosis and possible goals of care.  Additionally, I would recommend speech therapy consultation for ongoing dysphagia.        Therefore, he is discharged in fair and stable condition to a short-term general hosptial for inpatient care.    Transferred to [facility] due to insurance coverage.    Discharge date: 6/20      FOLLOW UP ITEMS POST DISCHARGE  As above    DISCHARGE DIAGNOSES  Principal Problem:    Dysphagia (POA: Yes)  Active Problems:    Tonsillar cancer (HCC) (POA: Unknown)    Failure to thrive in adult (POA: Unknown)    Hyponatremia (POA: Unknown)    Type 2 diabetes mellitus (HCC) (POA: Unknown)    Atrial fibrillation (HCC) (POA: Unknown)    Coronary disease (POA: Unknown)    Advance care planning (POA: Unknown)  Resolved Problems:    * No resolved hospital problems.  *      FOLLOW UP  No future appointments.  No follow-up provider specified.    MEDICATIONS ON DISCHARGE     Medication List        CONTINUE taking these medications        Instructions   atorvastatin 40 MG Tabs  Commonly known as: Lipitor   Take 40 mg by mouth every day.  Dose: 40 mg     Bactrim 400-80 MG Tabs  Generic drug: sulfamethoxazole-trimethoprim   Take 1 Tablet by mouth every 8 hours.  Dose: 1 Tablet     clindamycin 300 MG Caps  Commonly known as: Cleocin   Take 300 mg by mouth 3 times a day.  Dose: 300 mg     collagenase ointment  Commonly known as: Santyl   Apply 1 g topically every day.  Dose: 1 g     Eliquis 5mg Tabs  Generic drug: apixaban   Take 5 mg by mouth 2 times a day.  Dose: 5 mg     levothyroxine 75 MCG Tabs  Commonly known as: Synthroid   Take 75 mcg by mouth every morning on an empty stomach.  Dose: 75 mcg     metoprolol SR 25 MG Tb24  Commonly known as: Toprol XL   Take 25 mg by mouth every day.  Dose: 25 mg     Tradjenta 5 MG Tabs tablet  Generic drug: linagliptin   Take 5 mg by mouth every day.  Dose: 5 mg              Allergies  Allergies   Allergen Reactions    Pistachio Anaphylaxis and Swelling       DIET  Orders Placed This Encounter   Procedures    Diet NPO Restrict to: With Tube Feed     Standing Status:   Standing     Number of Occurrences:   1     Order Specific Question:   Diet NPO Restrict to:     Answer:   With Tube Feed [4]    Diet: Diet Tube Feed; Formula: Compleat; Compleat: Compleat 1.4 Standard Carton (will have to use open bag system); Goal Rate (mL/Hour): 60; Duration: 24 HR     Start at 25mL/hr. Advance per protocol to goal rate of 60 mL/hr.    Closer to discharge, may change to home feeding regimen of 5.5 cartons given at 75 mL/hr x 18.3 hrs     Standing Status:   Standing     Number of Occurrences:   1     Order Specific Question:   Diet     Answer:   Diet Tube Feed [35]     Order Specific Question:   Formula:     Answer:   Compleat     Order Specific Question:    Compleat:     Answer:   Compleat 1.4 Standard Carton     Comments:   will have to use open bag system     Order Specific Question:   Goal Rate (mL/Hour)     Answer:   60     Order Specific Question:   Route     Answer:   Gtube/PEG     Order Specific Question:   Duration     Answer:   24 HR    Discontinue Diet Tray     Standing Status:   Standing     Number of Occurrences:   1       ACTIVITY  As tolerated.  Weight bearing as tolerated    LINES, DRAINS, AND WOUNDS  This is an automated list. Peripheral IVs will be removed prior to discharge.  Peripheral IV 06/19/24 20 G Left Antecubital (Active)   Site Assessment Clean;Dry;Intact 06/20/24 0826   Dressing Type Transparent 06/20/24 0826   Line Status Lab draw;Flushed;Saline locked 06/20/24 0826   Dressing Status Clean;Dry;Intact 06/20/24 0826   Dressing Intervention N/A 06/19/24 2050   Dressing Change Due 06/26/24 06/20/24 0826   Date Primary Tubing Changed 06/19/24 06/19/24 2050   NEXT Primary Tubing Change  06/26/24 06/20/24 0826   Date IV Connector(s) Changed 06/19/24 06/20/24 0826   Infiltration Grading ([facility]) 0 06/20/24 0826   Phlebitis Scale ([facility] Only) 0 06/20/24 0826          Peripheral IV 06/19/24 20 G Left Antecubital (Active)   Site Assessment Clean;Dry;Intact 06/20/24 0826   Dressing Type Transparent 06/20/24 0826   Line Status Lab draw;Flushed;Saline locked 06/20/24 0826   Dressing Status Clean;Dry;Intact 06/20/24 0826   Dressing Intervention N/A 06/19/24 2050   Dressing Change Due 06/26/24 06/20/24 0826   Date Primary Tubing Changed 06/19/24 06/19/24 2050   NEXT Primary Tubing Change  06/26/24 06/20/24 0826   Date IV Connector(s) Changed 06/19/24 06/20/24 0826   Infiltration Grading ([facility]) 0 06/20/24 0826   Phlebitis Scale ([facility] Only) 0 06/20/24 0826               MENTAL STATUS ON TRANSFER     Patient is at his baseline mental status-alert and oriented x 4       CONSULTATIONS  None as of yet    PROCEDURES  None    LABORATORY  Lab Results    Component Value Date    SODIUM 134 (L) 06/20/2024    POTASSIUM 4.6 06/20/2024    CHLORIDE 104 06/20/2024    CO2 21 06/20/2024    GLUCOSE 234 (H) 06/20/2024    BUN 20 06/20/2024    CREATININE 0.77 06/20/2024    GLOMRATE 59 (L) 05/17/2023        Lab Results   Component Value Date    WBC 9.6 06/19/2024    HEMOGLOBIN 11.3 (L) 06/19/2024    HEMATOCRIT 34.8 (L) 06/19/2024    PLATELETCT 456 (H) 06/19/2024        Total time of the discharge process exceeds 49 minutes.

## 2024-06-20 NOTE — CARE PLAN
The patient is Watcher - Medium risk of patient condition declining or worsening    Shift Goals  Clinical Goals: keep pt NPO, provide suction as needed  Patient Goals: Rest  Family Goals: pt to eat    Progress made toward(s) clinical / shift goals:    Problem: Knowledge Deficit - Standard  Goal: Patient and family/care givers will demonstrate understanding of plan of care, disease process/condition, diagnostic tests and medications  6/20/2024 0307 by Radha Limon R.N.  Outcome: Progressing  6/20/2024 0305 by Radha Limon R.N.  Outcome: Progressing     Problem: Skin Integrity  Goal: Skin integrity is maintained or improved  Outcome: Progressing     Problem: Respiratory  Goal: Patient will achieve/maintain optimum respiratory ventilation and gas exchange  Outcome: Progressing       Patient is not progressing towards the following goals:

## 2024-06-20 NOTE — ASSESSMENT & PLAN NOTE
Possibly combination of factitious secondary to hyperglycemia and too much free water through his PEG tube  I have ordered free water flushes at 25 mL 4 times a day for now  Check urine and serum osmolarity  Check urine sodium  BMP every 6 hours

## 2024-06-20 NOTE — ASSESSMENT & PLAN NOTE
Long discussion with patient regarding goals of care and CODE STATUS.  I am concerned about him remaining a full code given his advanced age and medical comorbidities.  In no uncertain terms I expressed this concern to patient.  Patient answered appropriately to other topics of conversation but was unable to adequately participate in CODE STATUS discussion.  He was evasive and changed the subject when directly asked what his wishes would be.  At this time patient will remain a full code.  Discussed with CORBIN Mtz with palliative care.  They will also attempt to have a goals of care discussion with patient.  Time spent 20 minutes.

## 2024-06-20 NOTE — THERAPY
Occupational Therapy   Initial Evaluation     Patient Name: Dre Cleary  Age:  83 y.o., Sex:  male  Medical Record #: [id number]  Today's Date: 6/20/2024     Precautions  Precautions: Fall Risk, PEG Tube, Swallow Precautions    Assessment  Patient is 83 y.o. male with a diagnosis of abdominal pn, pneumonia.  Pt currently limited by decreased functional mobility, activity tolerance, strength, balance, which are affecting pt's ability to complete ADLs/IADLs at baseline. Pt would benefit from OT services in the acute care setting to maximize functional recovery.       Plan    Occupational Therapy Initial Treatment Plan   Treatment Interventions: (P) Self Care / Activities of Daily Living, Therapeutic Activity  Treatment Frequency: (P) 3 Times per Week  Duration: (P) Until Therapy Goals Met       Discharge Recommendations: (P) Recommend home health for continued occupational therapy services        06/20/24 1134   Prior Living Situation   Housing / Facility 2 Story House   Steps Into Home 2   Bathroom Set up Walk In Shower   Equipment Owned Single Point Cane;Front-Wheel Walker   Lives with - Patient's Self Care Capacity Spouse   Prior Level of ADL Function   Self Feeding Independent   Grooming / Hygiene Independent   Bathing Independent   Dressing Independent   Toileting Independent   Strength Upper Body   Upper Body Strength  X   Gross Strength Generalized Weakness, Equal Bilaterally.    Comments 4/5 B UEs   ADL Assessment   Grooming Supervision   Upper Body Dressing Minimal Assist   Lower Body Dressing Minimal Assist   Functional Mobility   Sit to Stand Contact Guard Assist   Bed, Chair, Wheelchair Transfer Contact Guard Assist   Short Term Goals   Short Term Goal # 1 supervised with UB dressing   Short Term Goal # 2 supervised with LB dressing   Short Term Goal # 3 supervised with ADL txfs   Occupational Therapy Initial Treatment Plan    Treatment Interventions Self Care / Activities of Daily Living;Therapeutic  Activity   Treatment Frequency 3 Times per Week   Duration Until Therapy Goals Met   Anticipated Discharge Equipment and Recommendations   Discharge Recommendations Recommend home health for continued occupational therapy services

## 2024-06-20 NOTE — H&P
Hospital Medicine History & Physical Note    Date of Service  6/19/2024    Primary Care Physician  Pcp Pt States None    Consultants      Specialist Names:     Code Status  Full Code    Chief Complaint  Chief Complaint   Patient presents with    Sent by MD     Tonsillar squamous cell carcinoma  Post radiation  Deep laryngeal secretions  Airway compromise, desats to low 80's         History of Presenting Illness  Dre Cleary is a 83 y.o. male who presented 6/19/2024 with past medical history of diabetes, tonsillar cancer, history of CABG, atrial fibrillation who presents to the hospital for dysphagia.  Patient has received chemo and radiation for his tonsillar cancer.  He was evaluated at his oncologist office determined that he was hypoxic and dehydrated.  Patient was having inability to control his secretions.  He denies any nausea, vomiting, fevers, chills, cough or shortness of breath.  Patient has completed his chemoradiation in May and is scheduled to have a PET scan in 5 months.  He follows Dr. Lagos as his oncologist.  The wife states that she gives him bolus feeding 4 times a day and also gives him 1 bottle of 16 ounces of free water 4 times a day through his PEG tube.    Chest x-ray interpreted by me found no acute pulmonary process      I discussed the plan of care with patient.    Review of Systems  Review of Systems   Constitutional:  Negative for chills, diaphoresis, fever and malaise/fatigue.   HENT:  Positive for congestion. Negative for ear discharge, ear pain, hearing loss, nosebleeds, sinus pain, sore throat and tinnitus.    Eyes:  Negative for blurred vision, double vision, photophobia and pain.   Respiratory:  Negative for cough, hemoptysis, sputum production, shortness of breath, wheezing and stridor.    Cardiovascular:  Negative for chest pain, palpitations, orthopnea, claudication, leg swelling and PND.   Gastrointestinal:  Negative for abdominal pain, blood in stool, constipation,  diarrhea, heartburn, melena, nausea and vomiting.   Genitourinary:  Negative for dysuria, flank pain, frequency, hematuria and urgency.   Musculoskeletal:  Negative for back pain, falls, joint pain, myalgias and neck pain.   Skin:  Negative for itching and rash.   Neurological:  Negative for dizziness, tingling, tremors, weakness and headaches.   Endo/Heme/Allergies:  Negative for environmental allergies and polydipsia. Does not bruise/bleed easily.   Psychiatric/Behavioral:  Negative for depression, hallucinations, substance abuse and suicidal ideas.        Past Medical History   has a past medical history of Cancer ([facility]) and Diabetes ([facility]).    Surgical History  Surgical history reviewed is not pertinent    Family History  Family history reviewed with patient. There is no family history that is pertinent to the chief complaint.     Social History   reports that he has never smoked. He has never used smokeless tobacco. He reports that he does not drink alcohol and does not use drugs.    Allergies  Allergies   Allergen Reactions    Pistachio Anaphylaxis and Swelling       Medications  Prior to Admission Medications   Prescriptions Last Dose Informant Patient Reported? Taking?   BACTRIM 400-80 MG Tab 6/19/2024 at am Family Member Yes Yes   Sig: Take 1 Tablet by mouth every 8 hours.   apixaban (ELIQUIS) 5mg Tab 6/19/2024 at am Family Member Yes Yes   Sig: Take 5 mg by mouth 2 times a day.   atorvastatin (LIPITOR) 40 MG Tab 6/19/2024 at am Family Member Yes Yes   Sig: Take 40 mg by mouth every day.   clindamycin (CLEOCIN) 300 MG Cap 6/1/2024 at finished Family Member Yes Yes   Sig: Take 300 mg by mouth 3 times a day.   collagenase (SANTYL) ointment 6/18/2024 at unk Family Member Yes Yes   Sig: Apply 1 g topically every day.   levothyroxine (SYNTHROID) 75 MCG Tab 6/19/2024 at am Family Member Yes Yes   Sig: Take 75 mcg by mouth every morning on an empty stomach.   linagliptin (TRADJENTA) 5 MG Tab tablet 6/19/2024 at am  Family Member Yes Yes   Sig: Take 5 mg by mouth every day.   metoprolol SR (TOPROL XL) 25 MG TABLET SR 24 HR 6/19/2024 at am Family Member Yes Yes   Sig: Take 25 mg by mouth every day.      Facility-Administered Medications: None       Physical Exam  Temp:  [36.6 °C (97.9 °F)-36.8 °C (98.3 °F)] 36.8 °C (98.3 °F)  Pulse:  [68-81] 71  Resp:  [16] 16  BP: (115-153)/(60-71) 135/65  SpO2:  [95 %-97 %] 97 %  Blood Pressure : (!) 148/70   Temperature: 36.6 °C (97.9 °F)   Pulse: 68   Respiration: 16   Pulse Oximetry: 97 %       Physical Exam  Vitals and nursing note reviewed.   Constitutional:       General: He is not in acute distress.     Appearance: Normal appearance. He is not ill-appearing, toxic-appearing or diaphoretic.   HENT:      Head: Normocephalic and atraumatic.      Nose: No congestion or rhinorrhea.      Mouth/Throat:      Pharynx: No posterior oropharyngeal erythema.   Eyes:      General: No scleral icterus.        Right eye: No discharge.   Cardiovascular:      Rate and Rhythm: Normal rate and regular rhythm.      Pulses: Normal pulses.      Heart sounds: Normal heart sounds. No murmur heard.     No friction rub. No gallop.   Pulmonary:      Effort: Pulmonary effort is normal. No respiratory distress.      Breath sounds: Normal breath sounds. No stridor. No wheezing, rhonchi or rales.   Abdominal:      General: There is no distension.      Tenderness: There is no abdominal tenderness.   Musculoskeletal:         General: No swelling, tenderness, deformity or signs of injury.      Cervical back: Normal range of motion.      Right lower leg: No edema.      Left lower leg: No edema.   Skin:     Capillary Refill: Capillary refill takes more than 3 seconds.      Coloration: Skin is not jaundiced or pale.      Findings: No bruising, erythema, lesion or rash.   Neurological:      General: No focal deficit present.      Mental Status: He is alert and oriented to person, place, and time.         Laboratory:  Recent  "Labs     06/19/24  1655   WBC 9.6   RBC 3.94*   HEMOGLOBIN 11.3*   HEMATOCRIT 34.8*   MCV 88.3   MCH 28.7   MCHC 32.5   RDW 52.7*   PLATELETCT 456*   MPV 10.3     Recent Labs     06/19/24  1655   SODIUM 129*   POTASSIUM 5.0   CHLORIDE 95*   CO2 21   GLUCOSE 367*   BUN 30*   CREATININE 0.83   CALCIUM 9.1     Recent Labs     06/19/24  1655   ALTSGPT 42   ASTSGOT 22   ALKPHOSPHAT 128*   TBILIRUBIN 0.2   GLUCOSE 367*         No results for input(s): \"NTPROBNP\" in the last 72 hours.      No results for input(s): \"TROPONINT\" in the last 72 hours.    Imaging:  DX-CHEST-PORTABLE (1 VIEW)   Final Result      No acute cardiac or pulmonary abnormalities are identified.   Other chronic degenerative and post surgical changes.          X-Ray:  I have personally reviewed the images and compared with prior images.  EKG:  I have personally reviewed the images and compared with prior images.    Assessment/Plan:  Justification for Admission Status  I anticipate this patient will require at least two midnights for appropriate medical management, necessitating inpatient admission because dysphagia    Patient will need a Med/Surg bed on ONCOLOGY service .  The need is secondary to dysphagia.    * Dysphagia- (present on admission)  Assessment & Plan  Secondary from radiation tonsillar cancer  SLP evaluation  Patient does have secretions and I will start him on IV Robinul for now  Keep n.p.o.  Patient does have a PEG tube    Coronary disease  Assessment & Plan  History of CABG  Continue Lipitor    Atrial fibrillation (HCC)  Assessment & Plan  Rate controlled  Continue metoprolol and Eliquis    Type 2 diabetes mellitus (HCC)  Assessment & Plan  Start on insulin sliding scale with serial Accu-Checks  Check hemoglobin A1c  Hypoglycemic protocol in place      Hyponatremia  Assessment & Plan  Possibly combination of factitious secondary to hyperglycemia and too much free water through his PEG tube  I have ordered free water flushes at 25 mL 4 " times a day for now  Check urine and serum osmolarity  Check urine sodium  BMP every 6 hours    Failure to thrive in adult  Assessment & Plan  Dietary consult  Monitor electrolytes    Tonsillar cancer (HCC)  Assessment & Plan  Status post chemo and radiation received in May  Follows Dr. Lagos        VTE prophylaxis: SCDs/TEDs      I discussed advance care planning for at least 30 minutes with the patient and wife, Although the patient does not want any more chemo and radiation.  The patient has opted Full code.  Time spent is exclusive of evaluation and management or other separately billable procedures.

## 2024-06-20 NOTE — CONSULTS
Palliative Care follow-up  Consult received and EMR reviewed. Case discussed with MD, updates appreciated. Patient is requesting transfer to [facility] due to insurance and this is being worked on, pending transfer. Per MD, GOC discussed earlier today and patient would like to remain full code. Will hold off on consult at this time. Please reconsult or call [phone] if patient not transferred to [facility].    Thank you for allowing Palliative Care to support this patient and family. Contact [phone] for additional assistance, change in patient status, or with any questions/concerns.      CORBIN Mtz  Palliative Care Nurse Practitioner   [phone]

## 2024-06-22 NOTE — DOCUMENTATION QUERY
"                                                                         [facility]                                                                       Query Response Note      PATIENT:               IRVING GONZÁLES  ACCT #:                  [id number]  MRN:                     [id number]  :                      1940  ADMIT DATE:       2024 4:10 PM  DISCH DATE:        2024 3:30 PM  RESPONDING  PROVIDER #:        [id number]           QUERY TEXT:    Per Registered Dietician assessment findings patient has \"severe weight loss of 16.9% in 7 months and severe fat loss and muscle wasting\".    Please clarify the nutritional status based upon the clinical indicators and treatment.    The patient's Clinical Indicators include:  American Society for Parenteral and Enteral Nutrition (ASPEN) criteria (presence of at least two)    Per dietary consult dated :   Meets criteria for severe malnutrition in the context of chronic disease r/t cancer AEB:  - Severe weight loss of 16.9% in 7 months.  - Severe fat loss and muscle wasting.    Risk Factors: Tonsillar cancer s/p chemo & radiation, dysphagia, PEG tube, failure to thrive, BMI 21.26.   Treatment: RD consult, 24-hour continuous tube feedings, monitor wt., monitor refeeding labs.    Thank You,  Leora Hart RN  Clinical    Connect via [email or web address] or [email or web address]  Options provided:   -- Malnutrition, Severe Protein-Calorie   -- Malnutrition, Moderate Protein-Calorie   -- Malnutrition, Mild Protein-Calorie   -- Insignificant Findings   -- Other explanation, (please specify other explanation)   -- Unable to determine      Query created by: Leora Hart on 2024 12:41 PM    RESPONSE TEXT:    Malnutrition, Severe Protein-Calorie          Electronically signed by:  BETSEY FLANAGAN MD 2024 5:53 AM              "